# Patient Record
Sex: MALE | Race: ASIAN | Employment: FULL TIME | URBAN - METROPOLITAN AREA
[De-identification: names, ages, dates, MRNs, and addresses within clinical notes are randomized per-mention and may not be internally consistent; named-entity substitution may affect disease eponyms.]

---

## 2019-07-12 ENCOUNTER — OFFICE VISIT (OUTPATIENT)
Dept: INTERNAL MEDICINE CLINIC | Facility: CLINIC | Age: 29
End: 2019-07-12

## 2019-07-12 VITALS
TEMPERATURE: 98 F | HEIGHT: 65.5 IN | WEIGHT: 203 LBS | HEART RATE: 83 BPM | BODY MASS INDEX: 33.42 KG/M2 | RESPIRATION RATE: 16 BRPM | SYSTOLIC BLOOD PRESSURE: 122 MMHG | DIASTOLIC BLOOD PRESSURE: 78 MMHG

## 2019-07-12 DIAGNOSIS — Z13.0 SCREENING, IRON DEFICIENCY ANEMIA: ICD-10-CM

## 2019-07-12 DIAGNOSIS — Z00.00 LABORATORY EXAMINATION ORDERED AS PART OF A ROUTINE GENERAL MEDICAL EXAMINATION: ICD-10-CM

## 2019-07-12 DIAGNOSIS — Z13.29 THYROID DISORDER SCREEN: ICD-10-CM

## 2019-07-12 DIAGNOSIS — Z13.89 SCREENING FOR GENITOURINARY CONDITION: ICD-10-CM

## 2019-07-12 DIAGNOSIS — Z13.220 LIPID SCREENING: ICD-10-CM

## 2019-07-12 DIAGNOSIS — Z00.00 ANNUAL PHYSICAL EXAM: Primary | ICD-10-CM

## 2019-07-12 PROCEDURE — 99385 PREV VISIT NEW AGE 18-39: CPT | Performed by: INTERNAL MEDICINE

## 2019-07-12 NOTE — PROGRESS NOTES
HPI:    Patient ID: Mumtaz Sagastume is a 33year old male. HPI  Mumtaz Sagastume is a 33year old male who presents for a complete physical exam.   HPI:   Pt complains of nothing    PAST MEDICAL, SOCIAL, FAMILY HISTORIES REVIEWED WITH PT  .       There is no immuni well nourished,in no apparent distress  SKIN: no rashes,no suspicious lesions  HEENT: atraumatic, normocephalic,ears and throat are clear  EYES:PERRLA, EOMI, normal optic disk,conjunctiva are clear  NECK: supple,no adenopathy,no bruits  LUNGS: clear to [employer]

## 2019-07-13 ENCOUNTER — LAB ENCOUNTER (OUTPATIENT)
Dept: LAB | Age: 29
End: 2019-07-13
Attending: INTERNAL MEDICINE
Payer: COMMERCIAL

## 2019-07-13 DIAGNOSIS — Z00.00 LABORATORY EXAMINATION ORDERED AS PART OF A ROUTINE GENERAL MEDICAL EXAMINATION: ICD-10-CM

## 2019-07-13 DIAGNOSIS — Z13.220 LIPID SCREENING: ICD-10-CM

## 2019-07-13 DIAGNOSIS — Z13.29 THYROID DISORDER SCREEN: ICD-10-CM

## 2019-07-13 DIAGNOSIS — Z13.0 SCREENING, IRON DEFICIENCY ANEMIA: ICD-10-CM

## 2019-07-13 DIAGNOSIS — Z13.89 SCREENING FOR GENITOURINARY CONDITION: ICD-10-CM

## 2019-07-13 LAB
ALBUMIN SERPL-MCNC: 4.5 G/DL (ref 3.4–5)
ALBUMIN/GLOB SERPL: 1.5 {RATIO} (ref 1–2)
ALP LIVER SERPL-CCNC: 57 U/L (ref 45–117)
ALT SERPL-CCNC: 84 U/L (ref 16–61)
ANION GAP SERPL CALC-SCNC: 7 MMOL/L (ref 0–18)
AST SERPL-CCNC: 35 U/L (ref 15–37)
BASOPHILS # BLD AUTO: 0.02 X10(3) UL (ref 0–0.2)
BASOPHILS NFR BLD AUTO: 0.3 %
BILIRUB SERPL-MCNC: 0.7 MG/DL (ref 0.1–2)
BILIRUB UR QL STRIP.AUTO: NEGATIVE
BUN BLD-MCNC: 11 MG/DL (ref 7–18)
BUN/CREAT SERPL: 12.9 (ref 10–20)
CALCIUM BLD-MCNC: 8.9 MG/DL (ref 8.5–10.1)
CHLORIDE SERPL-SCNC: 107 MMOL/L (ref 98–112)
CHOLEST SMN-MCNC: 213 MG/DL (ref ?–200)
CLARITY UR REFRACT.AUTO: CLEAR
CO2 SERPL-SCNC: 27 MMOL/L (ref 21–32)
COLOR UR AUTO: YELLOW
CREAT BLD-MCNC: 0.85 MG/DL (ref 0.7–1.3)
DEPRECATED RDW RBC AUTO: 39.2 FL (ref 35.1–46.3)
EOSINOPHIL # BLD AUTO: 0.16 X10(3) UL (ref 0–0.7)
EOSINOPHIL NFR BLD AUTO: 2.3 %
ERYTHROCYTE [DISTWIDTH] IN BLOOD BY AUTOMATED COUNT: 11.5 % (ref 11–15)
EST. AVERAGE GLUCOSE BLD GHB EST-MCNC: 91 MG/DL (ref 68–126)
GLOBULIN PLAS-MCNC: 3.1 G/DL (ref 2.8–4.4)
GLUCOSE BLD-MCNC: 85 MG/DL (ref 70–99)
GLUCOSE UR STRIP.AUTO-MCNC: NEGATIVE MG/DL
HBA1C MFR BLD HPLC: 4.8 % (ref ?–5.7)
HCT VFR BLD AUTO: 45.5 % (ref 39–53)
HDLC SERPL-MCNC: 45 MG/DL (ref 40–59)
HGB BLD-MCNC: 15.1 G/DL (ref 13–17.5)
IMM GRANULOCYTES # BLD AUTO: 0.04 X10(3) UL (ref 0–1)
IMM GRANULOCYTES NFR BLD: 0.6 %
KETONES UR STRIP.AUTO-MCNC: NEGATIVE MG/DL
LDLC SERPL CALC-MCNC: 141 MG/DL (ref ?–100)
LEUKOCYTE ESTERASE UR QL STRIP.AUTO: NEGATIVE
LYMPHOCYTES # BLD AUTO: 1.77 X10(3) UL (ref 1–4)
LYMPHOCYTES NFR BLD AUTO: 25.7 %
M PROTEIN MFR SERPL ELPH: 7.6 G/DL (ref 6.4–8.2)
MCH RBC QN AUTO: 31.1 PG (ref 26–34)
MCHC RBC AUTO-ENTMCNC: 33.2 G/DL (ref 31–37)
MCV RBC AUTO: 93.8 FL (ref 80–100)
MONOCYTES # BLD AUTO: 0.54 X10(3) UL (ref 0.1–1)
MONOCYTES NFR BLD AUTO: 7.8 %
NEUTROPHILS # BLD AUTO: 4.36 X10 (3) UL (ref 1.5–7.7)
NEUTROPHILS # BLD AUTO: 4.36 X10(3) UL (ref 1.5–7.7)
NEUTROPHILS NFR BLD AUTO: 63.3 %
NITRITE UR QL STRIP.AUTO: NEGATIVE
NONHDLC SERPL-MCNC: 168 MG/DL (ref ?–130)
OSMOLALITY SERPL CALC.SUM OF ELEC: 291 MOSM/KG (ref 275–295)
PH UR STRIP.AUTO: 6 [PH] (ref 4.5–8)
PLATELET # BLD AUTO: 255 10(3)UL (ref 150–450)
POTASSIUM SERPL-SCNC: 3.9 MMOL/L (ref 3.5–5.1)
PROT UR STRIP.AUTO-MCNC: NEGATIVE MG/DL
RBC # BLD AUTO: 4.85 X10(6)UL (ref 4.3–5.7)
SODIUM SERPL-SCNC: 141 MMOL/L (ref 136–145)
SP GR UR STRIP.AUTO: 1.02 (ref 1–1.03)
TRIGL SERPL-MCNC: 136 MG/DL (ref 30–149)
TSI SER-ACNC: 0.86 MIU/ML (ref 0.36–3.74)
UROBILINOGEN UR STRIP.AUTO-MCNC: <2 MG/DL
VLDLC SERPL CALC-MCNC: 27 MG/DL (ref 0–30)
WBC # BLD AUTO: 6.9 X10(3) UL (ref 4–11)

## 2019-07-13 PROCEDURE — 80053 COMPREHEN METABOLIC PANEL: CPT

## 2019-07-13 PROCEDURE — 36415 COLL VENOUS BLD VENIPUNCTURE: CPT

## 2019-07-13 PROCEDURE — 83036 HEMOGLOBIN GLYCOSYLATED A1C: CPT

## 2019-07-13 PROCEDURE — 80061 LIPID PANEL: CPT

## 2019-07-13 PROCEDURE — 81001 URINALYSIS AUTO W/SCOPE: CPT

## 2019-07-13 PROCEDURE — 84443 ASSAY THYROID STIM HORMONE: CPT

## 2019-07-13 PROCEDURE — 85025 COMPLETE CBC W/AUTO DIFF WBC: CPT

## 2019-07-15 ENCOUNTER — TELEPHONE (OUTPATIENT)
Dept: INTERNAL MEDICINE CLINIC | Facility: CLINIC | Age: 29
End: 2019-07-15

## 2019-07-15 DIAGNOSIS — R74.8 ABNORMAL LIVER ENZYMES: Primary | ICD-10-CM

## 2019-07-15 NOTE — TELEPHONE ENCOUNTER
----- Message from Giovana Deleon MD sent at 7/15/2019  8:46 AM CDT -----  No dm2  Renal is normal. Elevated ALT. Check hepptitis panel and ggt. Thyroid functions are normal  UA is bland. Blood but no RBC.  monitor  flp is normal. Cont low fat/chol diet  Cb

## 2019-07-20 ENCOUNTER — APPOINTMENT (OUTPATIENT)
Dept: LAB | Age: 29
End: 2019-07-20
Attending: INTERNAL MEDICINE
Payer: COMMERCIAL

## 2019-07-20 DIAGNOSIS — R74.8 ABNORMAL LIVER ENZYMES: ICD-10-CM

## 2019-07-20 LAB
ALBUMIN SERPL-MCNC: 4.6 G/DL (ref 3.4–5)
ALP LIVER SERPL-CCNC: 60 U/L (ref 45–117)
ALT SERPL-CCNC: 103 U/L (ref 16–61)
AST SERPL-CCNC: 29 U/L (ref 15–37)
BILIRUB DIRECT SERPL-MCNC: 0.2 MG/DL (ref 0–0.2)
BILIRUB SERPL-MCNC: 0.8 MG/DL (ref 0.1–2)
GGT SERPL-CCNC: 79 U/L (ref 15–85)
M PROTEIN MFR SERPL ELPH: 8.4 G/DL (ref 6.4–8.2)

## 2019-07-20 PROCEDURE — 36415 COLL VENOUS BLD VENIPUNCTURE: CPT

## 2019-07-20 PROCEDURE — 82977 ASSAY OF GGT: CPT

## 2019-07-20 PROCEDURE — 80076 HEPATIC FUNCTION PANEL: CPT

## 2019-07-22 ENCOUNTER — TELEPHONE (OUTPATIENT)
Dept: INTERNAL MEDICINE CLINIC | Facility: CLINIC | Age: 29
End: 2019-07-22

## 2019-07-22 DIAGNOSIS — R79.89 ELEVATED LIVER FUNCTION TESTS: Primary | ICD-10-CM

## 2019-07-22 DIAGNOSIS — R74.01 ELEVATED ALT MEASUREMENT: ICD-10-CM

## 2019-07-22 NOTE — TELEPHONE ENCOUNTER
----- Message from Maldonado Reynoso MD sent at 7/20/2019  5:21 PM CDT -----  Check US abd.  Hepatitis panel and refer to GI for eval of elevated lft

## 2019-07-24 ENCOUNTER — APPOINTMENT (OUTPATIENT)
Dept: LAB | Age: 29
End: 2019-07-24
Attending: INTERNAL MEDICINE
Payer: COMMERCIAL

## 2019-07-24 DIAGNOSIS — R79.89 ELEVATED LIVER FUNCTION TESTS: ICD-10-CM

## 2019-07-24 DIAGNOSIS — R74.01 ELEVATED ALT MEASUREMENT: ICD-10-CM

## 2019-07-24 LAB
ALBUMIN SERPL-MCNC: 4.8 G/DL (ref 3.4–5)
ALP LIVER SERPL-CCNC: 59 U/L (ref 45–117)
ALT SERPL-CCNC: 65 U/L (ref 16–61)
AST SERPL-CCNC: 20 U/L (ref 15–37)
BILIRUB DIRECT SERPL-MCNC: 0.2 MG/DL (ref 0–0.2)
BILIRUB SERPL-MCNC: 0.7 MG/DL (ref 0.1–2)
M PROTEIN MFR SERPL ELPH: 8.2 G/DL (ref 6.4–8.2)

## 2019-07-24 PROCEDURE — 36415 COLL VENOUS BLD VENIPUNCTURE: CPT

## 2019-07-24 PROCEDURE — 80076 HEPATIC FUNCTION PANEL: CPT

## 2019-08-03 ENCOUNTER — APPOINTMENT (OUTPATIENT)
Dept: LAB | Age: 29
End: 2019-08-03
Attending: INTERNAL MEDICINE
Payer: COMMERCIAL

## 2019-08-03 ENCOUNTER — HOSPITAL ENCOUNTER (OUTPATIENT)
Dept: ULTRASOUND IMAGING | Age: 29
Discharge: HOME | End: 2019-08-03
Attending: INTERNAL MEDICINE
Payer: COMMERCIAL

## 2019-08-03 DIAGNOSIS — R74.01 ELEVATED ALT MEASUREMENT: ICD-10-CM

## 2019-08-03 DIAGNOSIS — R79.89 ELEVATED LFTS: ICD-10-CM

## 2019-08-03 DIAGNOSIS — R79.89 ELEVATED LIVER FUNCTION TESTS: ICD-10-CM

## 2019-08-03 LAB
CERULOPLASMIN SERPL-MCNC: 25 MG/DL (ref 20–60)
DEPRECATED HBV CORE AB SER IA-ACNC: 225 NG/ML (ref 38–270)
HAV AB SER QL IA: NONREACTIVE
HBV SURFACE AB SER QL: REACTIVE
HBV SURFACE AB SERPL IA-ACNC: 10.76 MIU/ML
HBV SURFACE AG SER-ACNC: <0.1 [IU]/L
HBV SURFACE AG SERPL QL IA: NONREACTIVE
HCV AB SERPL QL IA: NONREACTIVE
IGA SERPL-MCNC: 450 MG/DL (ref 70–312)
IRON SATURATION: 23 % (ref 20–50)
IRON SERPL-MCNC: 100 UG/DL (ref 65–175)
TOTAL IRON BINDING CAPACITY: 429 UG/DL (ref 240–450)
TRANSFERRIN SERPL-MCNC: 288 MG/DL (ref 200–360)

## 2019-08-03 PROCEDURE — 82103 ALPHA-1-ANTITRYPSIN TOTAL: CPT

## 2019-08-03 PROCEDURE — 76700 US EXAM ABDOM COMPLETE: CPT | Performed by: INTERNAL MEDICINE

## 2019-08-03 PROCEDURE — 82104 ALPHA-1-ANTITRYPSIN PHENO: CPT

## 2019-08-03 PROCEDURE — 83516 IMMUNOASSAY NONANTIBODY: CPT

## 2019-08-03 PROCEDURE — 82728 ASSAY OF FERRITIN: CPT

## 2019-08-03 PROCEDURE — 86803 HEPATITIS C AB TEST: CPT

## 2019-08-03 PROCEDURE — 83540 ASSAY OF IRON: CPT

## 2019-08-03 PROCEDURE — 86038 ANTINUCLEAR ANTIBODIES: CPT

## 2019-08-03 PROCEDURE — 82390 ASSAY OF CERULOPLASMIN: CPT

## 2019-08-03 PROCEDURE — 86708 HEPATITIS A ANTIBODY: CPT

## 2019-08-03 PROCEDURE — 87340 HEPATITIS B SURFACE AG IA: CPT

## 2019-08-03 PROCEDURE — 82784 ASSAY IGA/IGD/IGG/IGM EACH: CPT

## 2019-08-03 PROCEDURE — 83550 IRON BINDING TEST: CPT

## 2019-08-03 PROCEDURE — 86706 HEP B SURFACE ANTIBODY: CPT

## 2019-08-03 PROCEDURE — 36415 COLL VENOUS BLD VENIPUNCTURE: CPT

## 2019-08-05 ENCOUNTER — TELEPHONE (OUTPATIENT)
Dept: INTERNAL MEDICINE CLINIC | Facility: CLINIC | Age: 29
End: 2019-08-05

## 2019-08-05 DIAGNOSIS — K82.8 GALLBLADDER MASS: Primary | ICD-10-CM

## 2019-08-05 LAB
F-ACTIN (SMOOTH MUSCLE) AB: 9 UNITS
MITOCHONDRIAL M2 AB, IGG: 8.4 UNITS

## 2019-08-05 NOTE — TELEPHONE ENCOUNTER
[facility]  [address] [facility] [place] [address], [address]  [phone]    Referral pending. LUISA tCOb. D/w pt results and recommendations. He expressed understanding. Referral information given. Referral placed.

## 2019-08-05 NOTE — TELEPHONE ENCOUNTER
----- Message from Maida Delgado MD sent at 8/5/2019  9:17 AM CDT -----  Mild fatty liver  Multiple gallbladder masses.  Possible polyps but would like pt to see Dr Anup Arzate (surgery) for eval due to number of masses

## 2019-08-06 LAB — ANA SCREEN: POSITIVE

## 2019-08-07 LAB
ALPHA-1-ANTITRYPSIN: 107 MG/DL
TISSUE TRANSGLUTAMINASE AB,IGA: 9 U/ML (ref ?–15)
TISSUE TRANSGLUTAMINASE IGA QUALITATIVE: NEGATIVE

## 2019-08-15 ENCOUNTER — OFFICE VISIT (OUTPATIENT)
Dept: SURGERY | Facility: CLINIC | Age: 29
End: 2019-08-15

## 2019-08-15 VITALS
TEMPERATURE: 99 F | BODY MASS INDEX: 32.14 KG/M2 | HEART RATE: 70 BPM | SYSTOLIC BLOOD PRESSURE: 103 MMHG | DIASTOLIC BLOOD PRESSURE: 73 MMHG | WEIGHT: 200 LBS | HEIGHT: 66 IN

## 2019-08-15 DIAGNOSIS — K82.4 GALLBLADDER POLYP: ICD-10-CM

## 2019-08-15 DIAGNOSIS — Z80.0 FAMILY HISTORY OF LIVER CANCER: ICD-10-CM

## 2019-08-15 DIAGNOSIS — R79.89 ELEVATED LFTS: ICD-10-CM

## 2019-08-15 DIAGNOSIS — K76.0 FATTY LIVER: Primary | ICD-10-CM

## 2019-08-15 PROCEDURE — 99243 OFF/OP CNSLTJ NEW/EST LOW 30: CPT | Performed by: SURGERY

## 2019-08-15 NOTE — H&P
New Patient Visit Note       Active Problems      1. Fatty liver    2. Gallbladder polyp    3. Elevated LFTs    4.  Family history of liver cancer        Chief Complaint   Patient presents with:  Gallbladder: new pt, gallbladder-lesions,  referred by Dr. Kush An Date   • Bleeding nose frequent    bleeding gums when brushing   • Hemorrhoids 2005    had hemorrhoid surgery at 2005   • High cholesterol July 2019    found out in recent lab test   • Pain in joints 2008    right knee due to ACL repair surgery   • Painful wheezing. Cardiovascular: Negative for chest pain, palpitations and leg swelling. Gastrointestinal: Negative for abdominal distention, abdominal pain, anal bleeding, blood in stool, constipation, diarrhea, nausea and vomiting.    Genitourinary: Barbra Lay multiple gallbladder polyps seen on ultrasound, elevated LFTs, and history of his paternal grandfather with liver cancer. ·   · The raj-operative care plan was discussed with the patient, who voices understanding.   Activity and lifting recommendations we

## 2019-08-24 ENCOUNTER — NURSE ONLY (OUTPATIENT)
Dept: INTERNAL MEDICINE CLINIC | Facility: CLINIC | Age: 29
End: 2019-08-24

## 2019-08-24 DIAGNOSIS — Z23 NEED FOR HEPATITIS A VACCINATION: Primary | ICD-10-CM

## 2019-08-24 PROCEDURE — 90632 HEPA VACCINE ADULT IM: CPT | Performed by: PHYSICIAN ASSISTANT

## 2019-08-24 PROCEDURE — 90471 IMMUNIZATION ADMIN: CPT | Performed by: PHYSICIAN ASSISTANT

## 2019-10-26 ENCOUNTER — APPOINTMENT (OUTPATIENT)
Dept: LAB | Age: 29
End: 2019-10-26
Attending: INTERNAL MEDICINE
Payer: COMMERCIAL

## 2019-10-26 DIAGNOSIS — R79.89 ELEVATED LFTS: ICD-10-CM

## 2019-10-26 PROCEDURE — 80076 HEPATIC FUNCTION PANEL: CPT

## 2019-10-26 PROCEDURE — 36415 COLL VENOUS BLD VENIPUNCTURE: CPT

## 2019-12-17 ENCOUNTER — OFFICE VISIT (OUTPATIENT)
Dept: INTERNAL MEDICINE CLINIC | Facility: CLINIC | Age: 29
End: 2019-12-17

## 2019-12-17 VITALS
SYSTOLIC BLOOD PRESSURE: 106 MMHG | WEIGHT: 187 LBS | TEMPERATURE: 98 F | HEART RATE: 60 BPM | DIASTOLIC BLOOD PRESSURE: 70 MMHG | RESPIRATION RATE: 16 BRPM | BODY MASS INDEX: 29.7 KG/M2 | HEIGHT: 66.5 IN

## 2019-12-17 DIAGNOSIS — K76.0 FATTY LIVER: ICD-10-CM

## 2019-12-17 DIAGNOSIS — M76.891 TENDONITIS OF KNEE, RIGHT: Primary | ICD-10-CM

## 2019-12-17 DIAGNOSIS — Z28.21 INFLUENZA VACCINATION DECLINED BY PATIENT: ICD-10-CM

## 2019-12-17 DIAGNOSIS — K82.4 GALLBLADDER POLYP: ICD-10-CM

## 2019-12-17 PROBLEM — R79.89 ELEVATED LFTS: Status: RESOLVED | Noted: 2019-08-15 | Resolved: 2019-12-17

## 2019-12-17 PROCEDURE — 99214 OFFICE O/P EST MOD 30 MIN: CPT | Performed by: INTERNAL MEDICINE

## 2019-12-17 RX ORDER — NAPROXEN 500 MG/1
500 TABLET ORAL 2 TIMES DAILY
Qty: 60 TABLET | Refills: 0 | Status: SHIPPED | OUTPATIENT
Start: 2019-12-17 | End: 2020-02-04 | Stop reason: ALTCHOICE

## 2019-12-18 NOTE — PROGRESS NOTES
HPI:    Patient ID: Alf Soto is a 26year old male. Knee Pain    The pain is present in the right knee. This is a new problem. The current episode started more than 1 month ago. There has been no history of extremity trauma.  The problem occurs [facility] Normal.     Skin: He is not diaphoretic. Nursing note and vitals reviewed.              ASSESSMENT/PLAN:   Tendonitis of knee, right  (primary encounter diagnosis)  Fatty liver  Gallbladder polyp  Influenza vaccination declined by patient  - refer to PT f

## 2019-12-18 NOTE — PATIENT INSTRUCTIONS
Nonalcoholic Fatty Liver Disease (NAFLD)  Nonalcoholic fatty liver disease (NAFLD) is a common disease of the liver. It occurs when you have too much fat in the liver.  If NAFLD is severe, it can cause liver damage that seems like the damage caused by dri · Belly pain and cramping  · Yellowing of the skin and eyes (jaundice), as well as dark urine, or light-colored stools  · Swelling in the belly or legs  Diagnosing NAFLD  Your healthcare provider may think you have NAFLD if routine blood tests show high le If NAFLD is caught early, it can be managed with treatment. Your healthcare provider will discuss further treatment choices with you as needed. Be sure to ask your provider about recommended vaccines.  These include vaccines for viruses that can cause live

## 2020-01-11 DIAGNOSIS — M76.891 TENDONITIS OF KNEE, RIGHT: ICD-10-CM

## 2020-01-12 RX ORDER — NAPROXEN 500 MG/1
TABLET ORAL
Qty: 60 TABLET | Refills: 0 | OUTPATIENT
Start: 2020-01-12

## 2020-01-13 ENCOUNTER — HOSPITAL ENCOUNTER (OUTPATIENT)
Dept: ULTRASOUND IMAGING | Facility: HOSPITAL | Age: 30
Discharge: HOME | End: 2020-01-13
Attending: INTERNAL MEDICINE
Payer: COMMERCIAL

## 2020-01-13 ENCOUNTER — TELEPHONE (OUTPATIENT)
Dept: INTERNAL MEDICINE CLINIC | Facility: CLINIC | Age: 30
End: 2020-01-13

## 2020-01-13 DIAGNOSIS — K76.0 FATTY LIVER: ICD-10-CM

## 2020-01-13 DIAGNOSIS — K74.00 FIBROSIS OF LIVER: Primary | ICD-10-CM

## 2020-01-13 DIAGNOSIS — K82.4 GALLBLADDER POLYP: ICD-10-CM

## 2020-01-13 PROCEDURE — 76981 USE PARENCHYMA: CPT | Performed by: INTERNAL MEDICINE

## 2020-01-13 PROCEDURE — 76700 US EXAM ABDOM COMPLETE: CPT | Performed by: INTERNAL MEDICINE

## 2020-01-13 NOTE — TELEPHONE ENCOUNTER
----- Message from Key Mccurdy MD sent at 1/13/2020 10:17 AM CST -----  Refer pt to DR Angi Guallpa (hepatology) for liver fibrosis

## 2020-01-13 NOTE — TELEPHONE ENCOUNTER
Patient expresses understanding of image results and processes going forward.    Will follow up with dr Beatriz Rebolledo at [address] [place] [address]  [phone]

## 2020-02-04 ENCOUNTER — OFFICE VISIT (OUTPATIENT)
Dept: RHEUMATOLOGY | Facility: CLINIC | Age: 30
End: 2020-02-04
Payer: COMMERCIAL

## 2020-02-04 ENCOUNTER — LAB ENCOUNTER (OUTPATIENT)
Dept: LAB | Age: 30
End: 2020-02-04
Attending: INTERNAL MEDICINE
Payer: COMMERCIAL

## 2020-02-04 VITALS
RESPIRATION RATE: 16 BRPM | HEIGHT: 66.5 IN | DIASTOLIC BLOOD PRESSURE: 66 MMHG | SYSTOLIC BLOOD PRESSURE: 108 MMHG | HEART RATE: 64 BPM | WEIGHT: 186 LBS | BODY MASS INDEX: 29.54 KG/M2

## 2020-02-04 DIAGNOSIS — R79.89 LFTS ABNORMAL: ICD-10-CM

## 2020-02-04 DIAGNOSIS — K76.0 FATTY LIVER: ICD-10-CM

## 2020-02-04 DIAGNOSIS — K82.4 GALLBLADDER POLYP: ICD-10-CM

## 2020-02-04 DIAGNOSIS — R76.8 POSITIVE ANA (ANTINUCLEAR ANTIBODY): ICD-10-CM

## 2020-02-04 DIAGNOSIS — R76.8 POSITIVE ANA (ANTINUCLEAR ANTIBODY): Primary | ICD-10-CM

## 2020-02-04 DIAGNOSIS — Z80.0 FAMILY HISTORY OF LIVER CANCER: ICD-10-CM

## 2020-02-04 LAB
ALBUMIN SERPL-MCNC: 4.6 G/DL (ref 3.4–5)
ALBUMIN/GLOB SERPL: 1.1 {RATIO} (ref 1–2)
ALP LIVER SERPL-CCNC: 66 U/L (ref 45–117)
ALT SERPL-CCNC: 43 U/L (ref 16–61)
ANION GAP SERPL CALC-SCNC: 5 MMOL/L (ref 0–18)
AST SERPL-CCNC: 21 U/L (ref 15–37)
BASOPHILS # BLD AUTO: 0.03 X10(3) UL (ref 0–0.2)
BASOPHILS NFR BLD AUTO: 0.5 %
BILIRUB SERPL-MCNC: 0.6 MG/DL (ref 0.1–2)
BUN BLD-MCNC: 12 MG/DL (ref 7–18)
BUN/CREAT SERPL: 14 (ref 10–20)
C4 SERPL-MCNC: 18.1 MG/DL (ref 10–40)
CALCIUM BLD-MCNC: 9.3 MG/DL (ref 8.5–10.1)
CHLORIDE SERPL-SCNC: 111 MMOL/L (ref 98–112)
CO2 SERPL-SCNC: 26 MMOL/L (ref 21–32)
CREAT BLD-MCNC: 0.86 MG/DL (ref 0.7–1.3)
DEPRECATED RDW RBC AUTO: 40.6 FL (ref 35.1–46.3)
EOSINOPHIL # BLD AUTO: 0.11 X10(3) UL (ref 0–0.7)
EOSINOPHIL NFR BLD AUTO: 2 %
ERYTHROCYTE [DISTWIDTH] IN BLOOD BY AUTOMATED COUNT: 11.9 % (ref 11–15)
GLOBULIN PLAS-MCNC: 4.2 G/DL (ref 2.8–4.4)
GLUCOSE BLD-MCNC: 79 MG/DL (ref 70–99)
HCT VFR BLD AUTO: 47 % (ref 39–53)
HGB BLD-MCNC: 15.4 G/DL (ref 13–17.5)
IMM GRANULOCYTES # BLD AUTO: 0.01 X10(3) UL (ref 0–1)
IMM GRANULOCYTES NFR BLD: 0.2 %
LYMPHOCYTES # BLD AUTO: 1.89 X10(3) UL (ref 1–4)
LYMPHOCYTES NFR BLD AUTO: 33.7 %
M PROTEIN MFR SERPL ELPH: 8.8 G/DL (ref 6.4–8.2)
MCH RBC QN AUTO: 31 PG (ref 26–34)
MCHC RBC AUTO-ENTMCNC: 32.8 G/DL (ref 31–37)
MCV RBC AUTO: 94.8 FL (ref 80–100)
MONOCYTES # BLD AUTO: 0.38 X10(3) UL (ref 0.1–1)
MONOCYTES NFR BLD AUTO: 6.8 %
NEUTROPHILS # BLD AUTO: 3.19 X10 (3) UL (ref 1.5–7.7)
NEUTROPHILS # BLD AUTO: 3.19 X10(3) UL (ref 1.5–7.7)
NEUTROPHILS NFR BLD AUTO: 56.8 %
OSMOLALITY SERPL CALC.SUM OF ELEC: 293 MOSM/KG (ref 275–295)
PATIENT FASTING Y/N/NP: NO
PLATELET # BLD AUTO: 234 10(3)UL (ref 150–450)
POTASSIUM SERPL-SCNC: 3.5 MMOL/L (ref 3.5–5.1)
RBC # BLD AUTO: 4.96 X10(6)UL (ref 4.3–5.7)
SED RATE-ML: 14 MM/HR (ref 0–12)
SODIUM SERPL-SCNC: 142 MMOL/L (ref 136–145)
WBC # BLD AUTO: 5.6 X10(3) UL (ref 4–11)

## 2020-02-04 PROCEDURE — 99244 OFF/OP CNSLTJ NEW/EST MOD 40: CPT | Performed by: INTERNAL MEDICINE

## 2020-02-04 PROCEDURE — 86160 COMPLEMENT ANTIGEN: CPT | Performed by: INTERNAL MEDICINE

## 2020-02-04 PROCEDURE — 85025 COMPLETE CBC W/AUTO DIFF WBC: CPT | Performed by: INTERNAL MEDICINE

## 2020-02-04 PROCEDURE — 85652 RBC SED RATE AUTOMATED: CPT | Performed by: INTERNAL MEDICINE

## 2020-02-04 PROCEDURE — 80053 COMPREHEN METABOLIC PANEL: CPT | Performed by: INTERNAL MEDICINE

## 2020-02-04 PROCEDURE — 86038 ANTINUCLEAR ANTIBODIES: CPT | Performed by: INTERNAL MEDICINE

## 2020-02-04 NOTE — PATIENT INSTRUCTIONS
Current diagnosis abnormal liver test in the past associated with fatty liver possibly liver fibrosis. Plan is to see liver specialist Dr. Jacob Enrique for evaluation. Positive CARMEN test found.   Positive CARMEN test can be associated with liver disease, however it

## 2020-02-04 NOTE — PROGRESS NOTES
[facility]  Report of Consultation    Kait Maldonado Patient Status:  No patient class for patient encounter    10/31/1990 MRN [id number]   Location [address] [place] [address] PCP Carroll Vera MD     Date of Consult:  20    Reason for Consultation: Betsy Cazares clear to auscultation and percussion. Heart: normal sinus rhythm without murmur. Abdomen: soft, nontender, no masses, no organomegaly. Extremities without any cyanosis, clubbing, or edema. Normal upper and lower extremities.   Skin: Within normal profile and complement levels.     Positive CARMEN (antinuclear antibody)  (primary encounter diagnosis)  LFTs abnormal  Fatty liver  Gallbladder polyp  Family history of liver cancer    Recommendations:   Patient Instructions   Current diagnosis abnormal live

## 2020-02-05 ENCOUNTER — TELEPHONE (OUTPATIENT)
Dept: RHEUMATOLOGY | Facility: CLINIC | Age: 30
End: 2020-02-05

## 2020-02-05 LAB — ANA SCREEN: NEGATIVE

## 2020-02-05 NOTE — TELEPHONE ENCOUNTER
Mr. Can Browne was called. Given test results. CARMEN is now negative. CBC chemistry profile normal.  Sed rate normal.  Complement level normal.  Liver tests normal.  Told to see liver specialist Dr. Nurys Pratt. .  Test results that I did are good. Dr Su York.

## 2020-02-17 ENCOUNTER — OFFICE VISIT (OUTPATIENT)
Dept: SURGERY | Facility: CLINIC | Age: 30
End: 2020-02-17
Payer: COMMERCIAL

## 2020-02-17 VITALS
HEART RATE: 71 BPM | DIASTOLIC BLOOD PRESSURE: 80 MMHG | OXYGEN SATURATION: 98 % | HEIGHT: 66.5 IN | RESPIRATION RATE: 18 BRPM | BODY MASS INDEX: 29.54 KG/M2 | SYSTOLIC BLOOD PRESSURE: 117 MMHG | WEIGHT: 186 LBS

## 2020-02-17 DIAGNOSIS — K76.0 NAFLD (NONALCOHOLIC FATTY LIVER DISEASE): Primary | ICD-10-CM

## 2020-02-17 DIAGNOSIS — K82.4 GALLBLADDER POLYP: ICD-10-CM

## 2020-02-17 NOTE — PROGRESS NOTES
[facility]  [address] [facility], [address]  [address]  [facility]  [phone] ([phone]) (liver cancer) Paternal Grandfather    • Other Paternal Grandfather          of liver cancer around 79   • Prostate Cancer Maternal Grandfather          around 79   • Stroke Paternal Grandmother          around 76      Social History    [employer] U/S due July '20  - HBV vaccinated as child, getting HAV vaccine  - Follow up July '20    Khris Delarosa MD  Director of Hepatology  Medical Director of [address] [place] [address] of [address] [place] [address].  [employer],

## 2020-02-22 ENCOUNTER — NURSE ONLY (OUTPATIENT)
Dept: INTERNAL MEDICINE CLINIC | Facility: CLINIC | Age: 30
End: 2020-02-22
Payer: COMMERCIAL

## 2020-02-22 DIAGNOSIS — Z23 NEED FOR HEPATITIS A VACCINATION: Primary | ICD-10-CM

## 2020-02-22 PROCEDURE — 90471 IMMUNIZATION ADMIN: CPT | Performed by: INTERNAL MEDICINE

## 2020-02-22 PROCEDURE — 90632 HEPA VACCINE ADULT IM: CPT | Performed by: INTERNAL MEDICINE

## 2020-04-01 ENCOUNTER — MED REC SCAN ONLY (OUTPATIENT)
Dept: INTERNAL MEDICINE CLINIC | Facility: CLINIC | Age: 30
End: 2020-04-01

## 2020-10-03 ENCOUNTER — LAB ENCOUNTER (OUTPATIENT)
Dept: LAB | Age: 30
End: 2020-10-03
Attending: INTERNAL MEDICINE
Payer: COMMERCIAL

## 2020-10-03 DIAGNOSIS — K76.0 NAFLD (NONALCOHOLIC FATTY LIVER DISEASE): ICD-10-CM

## 2020-10-03 PROCEDURE — 36415 COLL VENOUS BLD VENIPUNCTURE: CPT

## 2020-10-03 PROCEDURE — 80076 HEPATIC FUNCTION PANEL: CPT

## 2020-10-18 ENCOUNTER — HOSPITAL ENCOUNTER (OUTPATIENT)
Dept: ULTRASOUND IMAGING | Age: 30
Discharge: HOME | End: 2020-10-18
Attending: INTERNAL MEDICINE
Payer: COMMERCIAL

## 2020-10-18 DIAGNOSIS — K82.4 GALLBLADDER POLYP: ICD-10-CM

## 2020-10-18 DIAGNOSIS — K76.0 NAFLD (NONALCOHOLIC FATTY LIVER DISEASE): ICD-10-CM

## 2020-10-18 PROCEDURE — 76700 US EXAM ABDOM COMPLETE: CPT | Performed by: INTERNAL MEDICINE

## 2020-11-01 DIAGNOSIS — K76.0 FATTY LIVER: Primary | ICD-10-CM

## 2020-11-01 DIAGNOSIS — K82.4 GALLBLADDER POLYP: ICD-10-CM

## 2021-04-11 ENCOUNTER — HOSPITAL ENCOUNTER (OUTPATIENT)
Dept: ULTRASOUND IMAGING | Age: 31
Discharge: HOME | End: 2021-04-11
Attending: INTERNAL MEDICINE
Payer: COMMERCIAL

## 2021-04-11 DIAGNOSIS — K76.0 FATTY LIVER: ICD-10-CM

## 2021-04-11 DIAGNOSIS — K82.4 GALLBLADDER POLYP: ICD-10-CM

## 2021-04-11 PROCEDURE — 76700 US EXAM ABDOM COMPLETE: CPT | Performed by: INTERNAL MEDICINE

## 2021-04-19 DIAGNOSIS — K82.4 GALLBLADDER POLYP: ICD-10-CM

## 2021-04-19 DIAGNOSIS — K76.0 NAFLD (NONALCOHOLIC FATTY LIVER DISEASE): Primary | ICD-10-CM

## 2024-05-31 ENCOUNTER — TELEPHONE (OUTPATIENT)
Dept: ORTHOPEDICS CLINIC | Facility: CLINIC | Age: 34
End: 2024-05-31

## 2024-05-31 DIAGNOSIS — M25.561 RIGHT KNEE PAIN, UNSPECIFIED CHRONICITY: Primary | ICD-10-CM

## 2024-06-03 ENCOUNTER — HOSPITAL ENCOUNTER (OUTPATIENT)
Dept: GENERAL RADIOLOGY | Age: 34
Discharge: HOME | End: 2024-06-03
Attending: PHYSICIAN ASSISTANT
Payer: COMMERCIAL

## 2024-06-03 ENCOUNTER — OFFICE VISIT (OUTPATIENT)
Dept: ORTHOPEDICS CLINIC | Facility: CLINIC | Age: 34
End: 2024-06-03
Payer: COMMERCIAL

## 2024-06-03 VITALS — HEIGHT: 67 IN | BODY MASS INDEX: 29.03 KG/M2 | WEIGHT: 185 LBS

## 2024-06-03 DIAGNOSIS — M25.561 RIGHT KNEE PAIN, UNSPECIFIED CHRONICITY: ICD-10-CM

## 2024-06-03 DIAGNOSIS — S83.512A RUPTURE OF ANTERIOR CRUCIATE LIGAMENT OF LEFT KNEE, INITIAL ENCOUNTER: ICD-10-CM

## 2024-06-03 DIAGNOSIS — S83.511A RUPTURE OF ANTERIOR CRUCIATE LIGAMENT OF RIGHT KNEE, INITIAL ENCOUNTER: Primary | ICD-10-CM

## 2024-06-03 PROCEDURE — 73564 X-RAY EXAM KNEE 4 OR MORE: CPT | Performed by: PHYSICIAN ASSISTANT

## 2024-06-03 PROCEDURE — 99204 OFFICE O/P NEW MOD 45 MIN: CPT | Performed by: PHYSICIAN ASSISTANT

## 2024-06-03 NOTE — H&P
[facility] - ORTHOPEDICS  [address] [place] [id number]  [phone]     NEW PATIENT VISIT - HISTORY AND PHYSICAL EXAMINATION     Name: Isamar Aguilera   MRN: [id number]  Date: 6/3/2024     CC: Right knee pain.     REFERRED BY: Jason Hernandez MD    HPI:   Isamar Aguilera is a very pleasant 33 year old male who presents today for evaluation, consultation, and management of right knee pain and instability after surgically undergoing ACL reconstruction  at an outside hospital hamstring autograft in [place].  He immediately feels like she has not fully recovered and complains of stiffness, weakness and instability.  He rates his pain to be 4 out of 10 and pain is worse with any movement. He presents today for evaluation.  He works as an .     He desires to play basketball.       PMH:   Past Medical History:    Bleeding nose    bleeding gums when brushing    Hemorrhoids    had hemorrhoid surgery at     High cholesterol    found out in recent lab test    Pain in joints    right knee due to ACL repair surgery    Painful swallowing    happens when I had flu    Sputum production    still present    Weight gain    due to unhealthy diet and lack of rest       PAST SURGICAL HX:  Past Surgical History:   Procedure Laterality Date    Hemorrhoidectomy      Hemorrhoidectomy,int/ext,simple         FAMILY HX:  Family History   Problem Relation Age of Onset    Other (cad) Father     Heart Attack Father         had heart stent    Other (liver cancer) Paternal Grandfather     Other Paternal Grandfather          of liver cancer around 70    Prostate Cancer Maternal Grandfather          around 70    Stroke Paternal Grandmother          around 75       ALLERGIES:  Patient has no known allergies.    MEDICATIONS:   No current outpatient medications on file.       ROS: A comprehensive 14 point review of systems was performed and was negative aside from the aforementioned per history of  present illness.    SOCIAL HX:  Social History     Occupational History    Not on file   Tobacco Use    Smoking status: Never    Smokeless tobacco: Never    Tobacco comments:     never smoked   Vaping Use    Vaping status: Never Used   Substance and Sexual Activity    Alcohol use: Never    Drug use: Never    Sexual activity: Not on file       PE:   Vitals:    06/03/24 0921   Weight: 185 lb (83.9 kg)   Height: 5' 7\" (1.702 m)     Estimated body mass index is 28.98 kg/m² as calculated from the following:    Height as of this encounter: 5' 7\" (1.702 m).    Weight as of this encounter: 185 lb (83.9 kg).    Physical Exam  Constitutional:       Appearance: Normal appearance.   HENT:      Head: Normocephalic and atraumatic.   Eyes:      Extraocular Movements: Extraocular movements intact.   Neck:      Musculoskeletal: Normal range of motion and neck supple.   Cardiovascular:      Pulses: Normal pulses.   Pulmonary:      Effort: Pulmonary effort is normal. No respiratory distress.   Abdominal:      General: There is no distension.   Skin:     General: Skin is warm.      Capillary Refill: Capillary refill takes less than 2 seconds.      Findings: No bruising.   Neurological:      General: No focal deficit present.      Mental Status: Alert.   Psychiatric:         Mood and Affect: Mood normal.     Examination of the right knee demonstrates:     Skin is intact, warm and dry.   Atrophy: none    Effusion: none    Joint line tenderness: medial  Crepitation: none   Marcelle: Positive   Patellar mobility: normal without apprehension  J-sign: none    ROM: Extension full  Flexion 120 degrees  ACL:  increased laxity, right and left knee   PCL:  Negative Posterior Drawer  Collateral Ligaments: Stable to Varus and Valgus stress at 0 and 30 degrees  Strength: normal   Hip joint: normal pain-free ROM   Gait:  normal   Leg length: equal and symmetric  Alignment:  neutral     No obvious peripheral edema noted.   Distal neurovascular exam  demonstrates normal perfusion, intact sensation to light touch and full strength.       Radiographic Examination/Diagnostics:  I personally viewed, independently interpreted and radiology report was reviewed.      X-ray Right and Left knee, 6/3/2024- no evidence of fracture, foreign body or soft tissue injury.     IMPRESSION: Isamar Aguilera is a 33 year old male who presents with right knee pain concerning for ACL tear in the setting of prior ACL, and left knee pain concerning for chronic ACL tear.     PLAN:   We had a detailed discussion outlining the etiology, anatomy, pathophysiology, and natural history of the patient's findings. Imaging was reviewed in detail and correlated to a 3-dimensional model of the patient's pathology.     We reviewed the treatment of this disease condition.  In light of the chronicity of symptoms, loss of normal function, and  failure to progress conservatively we recommend an MRI to evaluate the integrity of the patient's right and left ACL. The patient will follow up after imaging.   Differential diagnosis includes but not limited to: cartilage injury/loose body, meniscus tear/injury, ACL tear, bone marrow edema, and osteoarthritis.     External records were also reviewed for pertinent historical findings contributing to the patients undiagnosed new problem with uncertain prognosis.     The patient had the opportunity to ask questions, and all questions were answered appropriately.           FOLLOW-UP:  Return to clinic following completion of MRI to review scan and findings.             Dimitryr KALEE Knox, [facility], PA-C Orthopedic Surgery / Sports Medicine Specialist  [facility]  [address] [place] [id number]   [email or web address]  [email or web address]  t: [phone]  o: [phone]  f: [phone]    This note was dictated using Dragon software.  While it was briefly proofread prior to completion, some grammatical, spelling, and word choice errors due to  dictation may still occur.

## 2024-11-26 ENCOUNTER — LAB ENCOUNTER (OUTPATIENT)
Dept: LAB | Age: 34
End: 2024-11-26
Attending: NURSE PRACTITIONER

## (undated) NOTE — LETTER
08/15/19    Patient: Sincere Vargas  : 10/31/1990 Visit date: 8/15/2019    Dear  Dr. Sai Rivera MD,    Thank you for referring Sincere Vargas to my practice. Please find my assessment and plan below.              Assessment   Fatty liver  (primary encounter diagnosi